# Patient Record
Sex: FEMALE | Employment: FULL TIME | URBAN - METROPOLITAN AREA
[De-identification: names, ages, dates, MRNs, and addresses within clinical notes are randomized per-mention and may not be internally consistent; named-entity substitution may affect disease eponyms.]

---

## 2020-01-09 ENCOUNTER — OFFICE VISIT (OUTPATIENT)
Dept: HEMATOLOGY | Age: 45
End: 2020-01-09

## 2020-01-09 VITALS
OXYGEN SATURATION: 96 % | BODY MASS INDEX: 36.95 KG/M2 | HEART RATE: 81 BPM | DIASTOLIC BLOOD PRESSURE: 87 MMHG | WEIGHT: 200.8 LBS | TEMPERATURE: 98.2 F | SYSTOLIC BLOOD PRESSURE: 126 MMHG | HEIGHT: 62 IN

## 2020-01-09 DIAGNOSIS — R74.8 ELEVATED LIVER ENZYMES: Primary | ICD-10-CM

## 2020-01-09 PROBLEM — F32.A DEPRESSION: Status: ACTIVE | Noted: 2020-01-09

## 2020-01-09 PROBLEM — Z98.84 H/O GASTRIC BYPASS: Status: ACTIVE | Noted: 2020-01-09

## 2020-01-09 PROBLEM — I10 HYPERTENSION: Status: ACTIVE | Noted: 2020-01-09

## 2020-01-09 PROBLEM — Z90.49 HISTORY OF CHOLECYSTECTOMY: Status: ACTIVE | Noted: 2020-01-09

## 2020-01-09 PROBLEM — E89.2 H/O PARATHYROIDECTOMY (HCC): Status: ACTIVE | Noted: 2020-01-09

## 2020-01-09 PROBLEM — Z90.710 S/P TAH (TOTAL ABDOMINAL HYSTERECTOMY): Status: ACTIVE | Noted: 2020-01-09

## 2020-01-09 PROBLEM — F41.9 ANXIETY: Status: ACTIVE | Noted: 2020-01-09

## 2020-01-09 RX ORDER — DULOXETIN HYDROCHLORIDE 60 MG/1
60 CAPSULE, DELAYED RELEASE ORAL DAILY
COMMUNITY
End: 2020-08-06

## 2020-01-09 RX ORDER — LISINOPRIL 40 MG/1
40 TABLET ORAL DAILY
COMMUNITY

## 2020-01-09 RX ORDER — PANTOPRAZOLE SODIUM 20 MG/1
40 TABLET, DELAYED RELEASE ORAL DAILY
COMMUNITY

## 2020-01-09 RX ORDER — ATENOLOL 50 MG/1
TABLET ORAL DAILY
COMMUNITY

## 2020-01-09 RX ORDER — AMLODIPINE BESYLATE 10 MG/1
TABLET ORAL DAILY
COMMUNITY

## 2020-01-09 NOTE — PROGRESS NOTES
[address] [facility], Margie PALACIOS Alberteen Athens, MD Vidal Retort, PA-C    Farhat Ocasio, ACNP-BC     Melania Gambino, [facility]   Douglas Delgado FNP-C    Piotr Fuentes, [facility]       Anne Deputado Ross [address]    at [address], [address] [place] [address], [place]  22.    [phone]    FAX: [address] [place] [address]    at [address], [address], [address]    [phone]    [address] [place] [address] Street: [phone]       Patient Care Team:  Fallon Pena MD as PCP - General (Family Practice)      Problem List  Date Reviewed: 1/9/2020          Codes Class Noted    Elevated liver enzymes ICD-10-CM: R74.8  ICD-9-CM: 790.5  1/9/2020        Hypertension ICD-10-CM: I10  ICD-9-CM: 401.9  1/9/2020        Anxiety ICD-10-CM: F41.9  ICD-9-CM: 300.00  1/9/2020        Depression ICD-10-CM: F32.9  ICD-9-CM: 086  1/9/2020        H/O gastric bypass ICD-10-CM: Z98.84  ICD-9-CM: V45.86  1/9/2020        History of cholecystectomy ICD-10-CM: Z90.49  ICD-9-CM: V45.79  1/9/2020        H/O parathyroidectomy ICD-10-CM: Z90.09  ICD-9-CM: V15.29  1/9/2020        S/P NISHI (total abdominal hysterectomy) ICD-10-CM: Z90.710  ICD-9-CM: V88.01  1/9/2020              The clinicians listed above have asked me to see Samson Tidwell in consultation regarding elevated liver enzymes and its management. All medical records sent by the referring physicians were reviewed     The patient is a 53 y.o.  female who was found to have elevated  liver transaminases in 1/2019. Serologic evaluation for markers of chronic liver disease has either not been performed or the results are not available. Imaging of the liver was not performed.       An assessment of liver fibrosis with biopsy or elastography has not been performed. The patient has no symptoms which can be attributed to the liver disorder. The patient is not currently experiencing the following symptoms of liver disease:  fatigue, pain in the right side over the liver,     The patient completes all daily activities without any functional limitations. ASSESSMENT AND PLAN:  Elevated liver enzymes  Persistent elevation in liver transaminases and alkaline phosphatase of unclear etiology at this time. Liver function is normal.  The platelet count is normal.      Based upon laboratory studies and imaging the patient does not appear to have significant liver injury. Serologic testing for causes of chronic liver disease were ordered. All were negative     The most likely causes for the liver chemistry abnormalities were discussed with the patient and include fatty liver disease,     Have performed laboratory testing to monitor liver function and degree of liver injury. This included BMP, hepatic panel, CBC with platelet count, INR. Will perform imaging of the liver with ultrasound. The need to perform an assessment of liver fibrosis was discussed with the patient. The Fibroscan can assess liver fibrosis and determine if a patient has advanced fibrosis or cirrhosis without the need for liver biopsy. This will be performed at the next office visit. If the Fibroscan suggests advanced fibrosis then a liver biopsy should be considered. The Fibroscan can be repeated annually or as often as clinically indicated to assess for fibrosis progression and/or regression. Elevation in Ferritin  There is an elevation in ferritin with Normal iron saturation. It is unlikely that the patient has hemochromatosis or is a carrier for an HFE gene. The elevation in ferritin reflects hepatic inflammation form fatty liver.     Screening for Hepatocellular Carcinoma  HCC screening is not necessary if the patient has no evidence of cirrhosis. Treatment of other medical problems in patients with chronic liver disease  There are no contraindications for the patient to take most medications that are necessary for treatment of other medical issues. Counseling for alcohol in patients with chronic liver disease  The patient was counseled regarding alcohol consumption and the effect of alcohol on chronic liver disease. It was recommended that all alcohol consumption be stopped and the patient be abstinent from alcohol    Vaccinations   Vaccination for viral hepatitis A and B is recommended since the patient has no serologic evidence of previous exposure or vaccination with immunity. Routine vaccinations against other bacterial and viral agents can be performed as indicated. Annual flu vaccination should be administered if indicated. ALLERGIES  Allergies   Allergen Reactions    Penicillins Rash    Sulfa (Sulfonamide Antibiotics) Other (comments)     \"Feeling of hot needle pricks\"    Erythromycin Other (comments)     vomiting        MEDICATIONS  Current Outpatient Medications   Medication Sig    atenolol (TENORMIN) 50 mg tablet Take  by mouth daily.  amLODIPine (NORVASC) 10 mg tablet Take  by mouth daily.  pantoprazole (PROTONIX) 20 mg tablet Take 40 mg by mouth daily.  lisinopril (PRINIVIL, ZESTRIL) 40 mg tablet Take 40 mg by mouth daily.  DULoxetine (CYMBALTA) 60 mg capsule Take 60 mg by mouth daily.  KRILL OIL PO Take  by mouth.  multivit/folic acid/vit K1 (ONE-A-DAY WOMEN'S 50 PLUS PO) Take  by mouth.  vitamin E acetate (VITAMIN E PO) Take  by mouth.  multivitamin (DAILY VITAMIN PO) Take  by mouth. Vit d , 1 a day    DANDELION PO Take  by mouth. No current facility-administered medications for this visit. SYSTEM REVIEW NOT RELATED TO LIVER DISEASE OR REVIEWED ABOVE:  Constitution systems: Negative for fever, chills, weight gain, weight loss. Eyes: Negative for visual changes.   ENT: Negative for sore throat, painful swallowing. Respiratory: Negative for cough, hemoptysis, SOB. Cardiology: Negative for chest pain, palpitations. GI:  Negative for constipation or diarrhea. : Negative for urinary frequency, dysuria, hematuria, nocturia. Skin: Negative for rash. Hematology: Negative for easy bruising, blood clots. Musculo-skelatal: Negative for back pain, muscle pain, weakness. Neurologic: Negative for headaches, dizziness, vertigo, memory problems not related to HE. Psychology: Negative for anxiety, depression. FAMILY HISTORY:  The father  Has/had the following following chronic disease(s): HTN, PVD. The mother Has/had the following chronic disease(s): CVA, MA, SZ. There is no family history of liver disease. SOCIAL HISTORY:  The patient is . The patient has 2 children, 2 stepchildren,   The patient currently smokes 3-4 cigarettes daily. 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 PHYSICAL EXAMINATION:  Visit Vitals  /87 (BP 1 Location: Left arm, BP Patient Position: Sitting)   Pulse 81   Temp 98.2 °F (36.8 °C) (Tympanic)   Ht 5' 2\" (1.575 m)   Wt 200 lb 12.8 oz (91.1 kg)   SpO2 96%   BMI 36.73 kg/m²     General: No acute distress. Eyes: Sclera anicteric. ENT: No oral lesions. Thyroid normal.  Nodes: No adenopathy. Skin: No spider angiomata. No jaundice. No palmar erythema. Respiratory: Lungs clear to auscultation. Cardiovascular: Regular heart rate. No murmurs. No JVD. Abdomen: Soft non-tender. Liver size normal to percussion/palpation. Spleen not palpable. No obvious ascites. Extremities: No edema. No muscle wasting. No gross arthritic changes. Neurologic: Alert and oriented. Cranial nerves grossly intact. No asterixis.     LABORATORY STUDIES:  [facility] of [address] [place] [address] & Units 1/9/2020   WBC 3.4 - 10.8 x10E3/uL 12.8 (H)   ANC 1.4 - 7.0 x10E3/uL 9.1 (H)   HGB 11.1 - 15.9 g/dL 14.9    - 450 x10E3/uL 302   INR 0.8 - 1.2 1.0   AST 0 - 40 IU/L 51 (H)   ALT 0 - 32 IU/L 62 (H)   Alk Phos 39 - 117 IU/L 136 (H)   Bili, Total 0.0 - 1.2 mg/dL 0.4   Bili, Direct 0.00 - 0.40 mg/dL 0.15   Albumin 3.5 - 5.5 g/dL 4.6   BUN 6 - 24 mg/dL 13   Creat 0.57 - 1.00 mg/dL 0.69   Na 134 - 144 mmol/L 139   K 3.5 - 5.2 mmol/L 4.8   Cl 96 - 106 mmol/L 98   CO2 20 - 29 mmol/L 19 (L)   Glucose 65 - 99 mg/dL 104 (H)     SEROLOGIES:  Serologies Latest Ref Rng & Units 1/9/2020   Hep A Ab, Total Negative Negative   Hep B Surface Ag Negative Negative   Hep B Core Ab, Total Negative Negative   Hep B Surface AB QL  Non Reactive   Hep C Ab 0.0 - 0.9 s/co ratio <0.1   Ferritin 15 - 150 ng/mL 244 (H)   Iron % Saturation 15 - 55 % 35   MARY, IFA  Negative   ASMCA 0 - 19 Units 4   Ceruloplasmin 19.0 - 39.0 mg/dL 32.5   Alpha-1 antitrypsin level 101 - 187 mg/dL 141     LIVER HISTOLOGY:  Not available or performed    ENDOSCOPIC PROCEDURES:  Not available or performed    RADIOLOGY:  1/2019. Ultrasound of liver. Echogenic consistent with chronic liver or fatty liver disease. No liver mass lesions. No dilated bile ducts. No ascites. OTHER TESTING:  Not available or performed    FOLLOW-UP:  All of the issues listed above in the Assessment and Plan were discussed with the patient. All questions were answered. The patient expressed a clear understanding of the above. [address] [place] [address] in 4 weeks for Fibroscan to review all data and determine the treatment plan.       MD Candie Dallas 13 of [address], [address] [place] [address] 22.  [phone]  [address] [facility]

## 2020-01-09 NOTE — Clinical Note
1/18/20 Patient: Francis Maravilla YOB: 1975 Date of Visit: 1/9/2020 Scott Penaloza MD 
[address] [facility] [address] [facility] [address] VIA Facsimile: [phone] Dear Scott Penaloza MD, Thank you for referring Ms. Francis Maravilla to [address] [facility] for evaluation. My notes for this consultation are attached. If you have questions, please do not hesitate to call me. I look forward to following your patient along with you. Sincerely, Praful Leach MD

## 2020-01-10 LAB
A1AT SERPL-MCNC: 141 MG/DL (ref 101–187)
ACTIN IGG SERPL-ACNC: 4 UNITS (ref 0–19)
ALBUMIN SERPL-MCNC: 4.6 G/DL (ref 3.5–5.5)
ALP SERPL-CCNC: 136 IU/L (ref 39–117)
ALT SERPL-CCNC: 62 IU/L (ref 0–32)
ANA TITR SER IF: NEGATIVE {TITER}
AST SERPL-CCNC: 51 IU/L (ref 0–40)
BASOPHILS # BLD AUTO: 0.1 X10E3/UL (ref 0–0.2)
BASOPHILS NFR BLD AUTO: 1 %
BILIRUB DIRECT SERPL-MCNC: 0.15 MG/DL (ref 0–0.4)
BILIRUB SERPL-MCNC: 0.4 MG/DL (ref 0–1.2)
BUN SERPL-MCNC: 13 MG/DL (ref 6–24)
BUN/CREAT SERPL: 19 (ref 9–23)
CALCIUM SERPL-MCNC: 9.8 MG/DL (ref 8.7–10.2)
CERULOPLASMIN SERPL-MCNC: 32.5 MG/DL (ref 19–39)
CHLORIDE SERPL-SCNC: 98 MMOL/L (ref 96–106)
CO2 SERPL-SCNC: 19 MMOL/L (ref 20–29)
COMMENT, 144067: NORMAL
CREAT SERPL-MCNC: 0.69 MG/DL (ref 0.57–1)
EOSINOPHIL # BLD AUTO: 0.2 X10E3/UL (ref 0–0.4)
EOSINOPHIL NFR BLD AUTO: 1 %
ERYTHROCYTE [DISTWIDTH] IN BLOOD BY AUTOMATED COUNT: 13.2 % (ref 11.7–15.4)
FERRITIN SERPL-MCNC: 244 NG/ML (ref 15–150)
GLUCOSE SERPL-MCNC: 104 MG/DL (ref 65–99)
HAV AB SER QL IA: NEGATIVE
HBV CORE AB SERPL QL IA: NEGATIVE
HBV SURFACE AB SER QL: NON REACTIVE
HBV SURFACE AG SERPL QL IA: NEGATIVE
HCT VFR BLD AUTO: 45.1 % (ref 34–46.6)
HCV AB S/CO SERPL IA: <0.1 S/CO RATIO (ref 0–0.9)
HGB BLD-MCNC: 14.9 G/DL (ref 11.1–15.9)
IMM GRANULOCYTES # BLD AUTO: 0.1 X10E3/UL (ref 0–0.1)
IMM GRANULOCYTES NFR BLD AUTO: 1 %
INR PPP: 1 (ref 0.8–1.2)
IRON SATN MFR SERPL: 35 % (ref 15–55)
IRON SERPL-MCNC: 125 UG/DL (ref 27–159)
LYMPHOCYTES # BLD AUTO: 2.5 X10E3/UL (ref 0.7–3.1)
LYMPHOCYTES NFR BLD AUTO: 20 %
MCH RBC QN AUTO: 33.3 PG (ref 26.6–33)
MCHC RBC AUTO-ENTMCNC: 33 G/DL (ref 31.5–35.7)
MCV RBC AUTO: 101 FL (ref 79–97)
MONOCYTES # BLD AUTO: 0.9 X10E3/UL (ref 0.1–0.9)
MONOCYTES NFR BLD AUTO: 7 %
NEUTROPHILS # BLD AUTO: 9.1 X10E3/UL (ref 1.4–7)
NEUTROPHILS NFR BLD AUTO: 70 %
PLATELET # BLD AUTO: 302 X10E3/UL (ref 150–450)
POTASSIUM SERPL-SCNC: 4.8 MMOL/L (ref 3.5–5.2)
PROT SERPL-MCNC: 7.3 G/DL (ref 6–8.5)
PROTHROMBIN TIME: 10.6 SEC (ref 9.1–12)
RBC # BLD AUTO: 4.48 X10E6/UL (ref 3.77–5.28)
SODIUM SERPL-SCNC: 139 MMOL/L (ref 134–144)
TIBC SERPL-MCNC: 355 UG/DL (ref 250–450)
UIBC SERPL-MCNC: 230 UG/DL (ref 131–425)
WBC # BLD AUTO: 12.8 X10E3/UL (ref 3.4–10.8)

## 2020-01-16 ENCOUNTER — HOSPITAL ENCOUNTER (OUTPATIENT)
Dept: ULTRASOUND IMAGING | Age: 45
Discharge: HOME | End: 2020-01-16
Attending: INTERNAL MEDICINE
Payer: COMMERCIAL

## 2020-01-16 DIAGNOSIS — R74.8 ELEVATED LIVER ENZYMES: ICD-10-CM

## 2020-01-16 PROCEDURE — 76705 ECHO EXAM OF ABDOMEN: CPT

## 2020-02-06 ENCOUNTER — OFFICE VISIT (OUTPATIENT)
Dept: HEMATOLOGY | Age: 45
End: 2020-02-06

## 2020-02-06 VITALS
BODY MASS INDEX: 36.66 KG/M2 | WEIGHT: 199.2 LBS | TEMPERATURE: 97.5 F | SYSTOLIC BLOOD PRESSURE: 116 MMHG | OXYGEN SATURATION: 91 % | HEIGHT: 62 IN | HEART RATE: 74 BPM | DIASTOLIC BLOOD PRESSURE: 79 MMHG

## 2020-02-06 DIAGNOSIS — E66.01 SEVERE OBESITY (HCC): ICD-10-CM

## 2020-02-06 DIAGNOSIS — R74.8 ELEVATED LIVER ENZYMES: Primary | ICD-10-CM

## 2020-02-06 NOTE — PROGRESS NOTES
[facility], Aayush PALACIOS, Rebekah Bae MD Garlan Mattock, ABBI Stewart, ACN-BC     April S Immanuel, [facility]   Kenneth Thompson, FNP-C    Ludmila Adrian, [facility]       Anne Rey [facility] [address]    at [address], [address] [place] [address], [facility] 22.    [phone]    FAX: [address] [place] [address] Avenue    [address], [address]    [phone]    [address] [place] [address] Street: [phone]       Patient Care Team:  María Carlton MD as PCP - General (Family Practice)      Problem List  Date Reviewed: 1/18/2020          Codes Class Noted    Severe obesity ([address] ICD-10-CM: E66.01  ICD-9-CM: 278.01  2/6/2020        Elevated liver enzymes ICD-10-CM: R74.8  ICD-9-CM: 790.5  1/9/2020        Hypertension ICD-10-CM: I10  ICD-9-CM: 401.9  1/9/2020        Anxiety ICD-10-CM: F41.9  ICD-9-CM: 300.00  1/9/2020        Depression ICD-10-CM: F32.9  ICD-9-CM: 476  1/9/2020        H/O gastric bypass ICD-10-CM: Z98.84  ICD-9-CM: V45.86  1/9/2020        History of cholecystectomy ICD-10-CM: Z90.49  ICD-9-CM: V45.79  1/9/2020        H/O parathyroidectomy ICD-10-CM: Z90.09  ICD-9-CM: V15.29  1/9/2020        S/P NISHI (total abdominal hysterectomy) ICD-10-CM: Z90.710  ICD-9-CM: V88.01  1/9/2020            Jyotsna Chiu returns to the [address] for management of elevated liver enzymes. The active problem list, all pertinent past medical history, medications, radiologic findings and laboratory findings related to the liver disorder were reviewed with the patient. The patient is a 53 y.o.  female who was found to have elevated  liver transaminases in 1/2019. Serologic evaluation for markers of chronic liver disease were negative.      Imaging of the liver was performed with ultrasound in 1/2020. This demonstrated increased echogenic changes with no mass. An assessment of liver fibrosis with biopsy or elastography has not been performed. Elastography is planned for today's visit. The patient has no symptoms which can be attributed to the liver disorder. The patient is not currently experiencing the following symptoms of liver disease:  fatigue, pain in the right side over the liver,     The patient completes all daily activities without any functional limitations. She has had foot surgery/fracture and has been limited in mobility for the past 6 months. She is now out of a walking cast.     ASSESSMENT AND PLAN:  Elevated liver enzymes  Serologies are negative for all common liver disease. I will draw AMA in the office today as this was not previously done. I have reviewed with the patient the results of her work-up so far and the imaging which has suggested steatosis. Fibroscan supports finding of steatosis without fibrosis. Based upon laboratory studies and imaging the patient does not appear to have significant liver injury    Persistent elevation in liver transaminases and alkaline phosphatase of unclear etiology at this time. .    The most likely causes for the liver chemistry abnormalities were discussed with the patient and include fatty liver disease. Elevation in Ferritin  There is an elevation in ferritin with normal iron saturation. It is unlikely that the patient has hemochromatosis or is a carrier for an HFE gene. The elevation in ferritin reflects hepatic inflammation form fatty liver and/'or regular alcohol use. Counseling for diet and weight loss in patients with confirmed or suspected NAFLD  The patient was counseled regarding diet and exercise to achieve weight loss. The best diet for patients with fatty liver is one very low in carbohydrates and enriched with protein.       There is no medication or vitamin supplements that we advocate for CHENG. Using glitazones in patients without diabetes mellitus has been shown to reduce fat content in the liver but has no effect on fibrosis and is associated with weight gain. Vitamin E has also been used but the data is not very good and most experts no longer advocate this. We have set a target goal for weight loss of ~165# as a long-term target. She is hopeful that she will be able to increase her activity now that her foot is better healed. We have also discussed cutting back on alcohol intake as a calorie reduction as well. Screening for Hepatocellular Carcinoma  HCC screening is not necessary as the patient has no evidence of cirrhosis. Treatment of other medical problems in patients with chronic liver disease  There are no contraindications for the patient to take most medications that are necessary for treatment of other medical issues. Counseling for alcohol in patients with chronic liver disease  The patient was counseled regarding alcohol consumption and the effect of alcohol on chronic liver disease. It was recommended that all alcohol consumption be stopped and the patient be abstinent from alcohol    Vaccinations   Vaccination for viral hepatitis A and B is recommended since the patient has no serologic evidence of previous exposure or vaccination with immunity. Routine vaccinations against other bacterial and viral agents can be performed as indicated. Annual flu vaccination should be administered if indicated. ALLERGIES  Allergies   Allergen Reactions    Penicillins Rash    Sulfa (Sulfonamide Antibiotics) Other (comments)     \"Feeling of hot needle pricks\"    Erythromycin Other (comments)     vomiting        MEDICATIONS  Current Outpatient Medications   Medication Sig    atenolol (TENORMIN) 50 mg tablet Take  by mouth daily.  amLODIPine (NORVASC) 10 mg tablet Take  by mouth daily.     pantoprazole (PROTONIX) 20 mg tablet Take 40 mg by mouth daily.  lisinopril (PRINIVIL, ZESTRIL) 40 mg tablet Take 40 mg by mouth daily.  DULoxetine (CYMBALTA) 60 mg capsule Take 60 mg by mouth daily.  KRILL OIL PO Take  by mouth.  multivit/folic acid/vit K1 (ONE-A-DAY WOMEN'S 50 PLUS PO) Take  by mouth.  vitamin E acetate (VITAMIN E PO) Take  by mouth.  multivitamin (DAILY VITAMIN PO) Take  by mouth. Vit d , 1 a day    DANDELION PO Take  by mouth. No current facility-administered medications for this visit. SYSTEM REVIEW NOT RELATED TO LIVER DISEASE OR REVIEWED ABOVE:  Constitution systems: Negative for fever, chills. Weight gain of 20# in the past 6 months. Eyes: Negative for visual changes. ENT: Negative for sore throat, painful swallowing. Respiratory: Negative for cough, hemoptysis, SOB. Cardiology: Negative for chest pain, palpitations. GI:  Negative for constipation or diarrhea. : Negative for urinary frequency, dysuria, hematuria, nocturia. Skin: Negative for rash. Hematology: Negative for easy bruising, blood clots. Musculo-skeletal: Negative for back pain, muscle pain, weakness. Neurologic: Negative for headaches, dizziness, vertigo, memory problems not related to HE. Psychology: Negative for anxiety, depression. FAMILY HISTORY:  The father  Has/had the following following chronic disease(s): HTN, PVD. The mother Has/had the following chronic disease(s): CVA, MA, SZ. There is no family history of liver disease. SOCIAL HISTORY:  The patient is . The patient has 2 children, 2 stepchildren. The patient currently smokes 3-4 cigarettes daily. The patient consumes 1-5 alcoholic beverages per day. 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       PHYSICAL EXAMINATION:  Visit Vitals  /79 (BP 1 Location: Left arm, BP Patient Position: Sitting)   Pulse 74   Temp 97.5 °F (36.4 °C) (Tympanic)   Ht 5' 2\" (1.575 m)   Wt 199 lb 3.2 oz (90.4 kg)   SpO2 91%   BMI 36.43 kg/m² General: No acute distress. Eyes: Sclera anicteric. ENT: No oral lesions. Thyroid normal.  Nodes: No adenopathy. Skin: No spider angiomata. No jaundice. No palmar erythema. Respiratory: Lungs clear to auscultation. Cardiovascular: Regular heart rate. No murmurs. No JVD. Abdomen: Soft non-tender. Liver size normal to percussion/palpation. Spleen not palpable. No obvious ascites. Extremities: No edema. No muscle wasting. No gross arthritic changes. Neurologic: Alert and oriented. Cranial nerves grossly intact. No asterixis. LABORATORY STUDIES:  Liver [place] [address] Units 1/9/2020   WBC 3.4 - 10.8 x10E3/uL 12.8 (H)   ANC 1.4 - 7.0 x10E3/uL 9.1 (H)   HGB 11.1 - 15.9 g/dL 14.9    - 450 x10E3/uL 302   INR 0.8 - 1.2 1.0   AST 0 - 40 IU/L 51 (H)   ALT 0 - 32 IU/L 62 (H)   Alk Phos 39 - 117 IU/L 136 (H)   Bili, Total 0.0 - 1.2 mg/dL 0.4   Bili, Direct 0.00 - 0.40 mg/dL 0.15   Albumin 3.5 - 5.5 g/dL 4.6   BUN 6 - 24 mg/dL 13   Creat 0.57 - 1.00 mg/dL 0.69   Na 134 - 144 mmol/L 139   K 3.5 - 5.2 mmol/L 4.8   Cl 96 - 106 mmol/L 98   CO2 20 - 29 mmol/L 19 (L)   Glucose 65 - 99 mg/dL 104 (H)   Additional lab values drawn at today's office visit are pending at the time of documentation. SEROLOGIES:  Serologies Latest Ref Rng & Units 1/9/2020   Hep A Ab, Total Negative Negative   Hep B Surface Ag Negative Negative   Hep B Core Ab, Total Negative Negative   Hep B Surface AB QL  Non Reactive   Hep C Ab 0.0 - 0.9 s/co ratio <0.1   Ferritin 15 - 150 ng/mL 244 (H)   Iron % Saturation 15 - 55 % 35   MARY, IFA  Negative   ASMCA 0 - 19 Units 4   Ceruloplasmin 19.0 - 39.0 mg/dL 32.5   Alpha-1 antitrypsin level 101 - 187 mg/dL 141     LIVER HISTOLOGY:  2/2020. FibroScan performed at [facility]. EkPa was 4.5. Suggested fibrosis level is F0-1. CAP score is 285, this is consistent with steatosis.      ENDOSCOPIC PROCEDURES:  Not available or performed    RADIOLOGY:  1/2019. Ultrasound of liver. Echogenic consistent with chronic liver or fatty liver disease. No liver mass lesions. No dilated bile ducts. No ascites. OTHER TESTING:  Not available or performed    FOLLOW-UP:  All of the issues listed above in the Assessment and Plan were discussed with the patient. All questions were answered. The patient expressed a clear understanding of the above. [address] [id number] in 6 months for monitoring response to weight loss.      Gianna Fulton PA-C  Liver [place] 59, [address] 22.  [phone]  [address] [place] [address]

## 2020-02-06 NOTE — PROGRESS NOTES
Pari Billingsley is a 53 y.o. female  Chief Complaint   Patient presents with    Follow-up     Fibro Scan         Visit Vitals  /79 (BP 1 Location: Left arm, BP Patient Position: Sitting)   Pulse 74   Temp 97.5 °F (36.4 °C) (Tympanic)   Ht 5' 2\" (1.575 m)   Wt 199 lb 3.2 oz (90.4 kg)   SpO2 91%   BMI 36.43 kg/m²         3 most recent PHQ Screens 1/9/2020   Little interest or pleasure in doing things Not at all   Feeling down, depressed, irritable, or hopeless Not at all   Total Score PHQ 2 0     Learning Assessment 1/9/2020   PRIMARY LEARNER Patient   HIGHEST LEVEL OF EDUCATION - PRIMARY LEARNER  2 YEARS [place] PRIMARY LEARNER NONE   CO-LEARNER CAREGIVER No   PRIMARY LANGUAGE ENGLISH   LEARNER PREFERENCE PRIMARY DEMONSTRATION   ANSWERED BY patient   RELATIONSHIP SELF     Abuse Screening Questionnaire 1/9/2020   Do you ever feel afraid of your partner? N   Are you in a relationship with someone who physically or mentally threatens you? N   Is it safe for you to go home? Y         1. Have you been to the ER, urgent care clinic since your last visit? Hospitalized since your last visit? No    2. Have you seen or consulted any other health care providers outside of the [address] [place] [address] since your last visit? Include any pap smears or colon screening.  No

## 2020-02-07 LAB
ACE SERPL-CCNC: 7 U/L (ref 14–82)
ALBUMIN SERPL-MCNC: 4.4 G/DL (ref 3.8–4.8)
ALP SERPL-CCNC: 282 IU/L (ref 39–117)
ALT SERPL-CCNC: 437 IU/L (ref 0–32)
AST SERPL-CCNC: 501 IU/L (ref 0–40)
BILIRUB DIRECT SERPL-MCNC: 0.27 MG/DL (ref 0–0.4)
BILIRUB SERPL-MCNC: 0.7 MG/DL (ref 0–1.2)
ERYTHROCYTE [DISTWIDTH] IN BLOOD BY AUTOMATED COUNT: 13.4 % (ref 11.7–15.4)
HCT VFR BLD AUTO: 44.5 % (ref 34–46.6)
HGB BLD-MCNC: 15 G/DL (ref 11.1–15.9)
MCH RBC QN AUTO: 34.2 PG (ref 26.6–33)
MCHC RBC AUTO-ENTMCNC: 33.7 G/DL (ref 31.5–35.7)
MCV RBC AUTO: 102 FL (ref 79–97)
PLATELET # BLD AUTO: 302 X10E3/UL (ref 150–450)
RBC # BLD AUTO: 4.38 X10E6/UL (ref 3.77–5.28)
WBC # BLD AUTO: 9.8 X10E3/UL (ref 3.4–10.8)

## 2020-02-09 LAB — MITOCHONDRIA M2 IGG SER-ACNC: <20 UNITS (ref 0–20)

## 2020-02-14 DIAGNOSIS — R74.8 ELEVATED LIVER ENZYMES: Primary | ICD-10-CM

## 2020-02-14 NOTE — PROGRESS NOTES
Marked rise in liver enzymes. Reviewed with patient, concern that this elevation in enzymes may be due to alcohol influence as there is no other etiology identified, she has started no new medications and is without symptoms. Asked that she moderate/eliminate her alcohol intake and repeat labs in 3-4 weeks. Lab requisition mailed to patient.

## 2020-02-17 ENCOUNTER — DOCUMENTATION ONLY (OUTPATIENT)
Dept: HEMATOLOGY | Age: 45
End: 2020-02-17

## 2020-02-24 ENCOUNTER — DOCUMENTATION ONLY (OUTPATIENT)
Dept: HEMATOLOGY | Age: 45
End: 2020-02-24

## 2020-02-24 NOTE — PROGRESS NOTES
Spoke with patient, she had restarted Duloxetine possibly prior to the elevation of the liver enzymes. This is an unusual (<1% of pts have 3x ULN) event, but I have asked her to repeat abs as we had discussed and remain on Cymbalta for the time being. She has cut out alcohol nearly entirely. Will recheck labs in 2 wks.

## 2020-03-09 DIAGNOSIS — R74.8 ELEVATED LIVER ENZYMES: ICD-10-CM

## 2020-03-13 ENCOUNTER — TELEPHONE (OUTPATIENT)
Dept: HEMATOLOGY | Age: 45
End: 2020-03-13

## 2020-03-13 LAB
ALBUMIN SERPL-MCNC: 4.3 G/DL (ref 3.8–4.8)
ALP SERPL-CCNC: 101 IU/L (ref 39–117)
ALT SERPL-CCNC: 26 IU/L (ref 0–32)
AST SERPL-CCNC: 24 IU/L (ref 0–40)
BILIRUB DIRECT SERPL-MCNC: 0.08 MG/DL (ref 0–0.4)
BILIRUB SERPL-MCNC: 0.2 MG/DL (ref 0–1.2)

## 2020-03-13 NOTE — PROGRESS NOTES
Pt notified of normalized liver enzymes. She has continued with moderation of alcohol intake, dietary changes to support weight loss and elimination of Cymbalta. She is doing well without the SSRI at this time. Advised follow-up in 8/2020 as scheduled. Will forward a copy of labs to PCP.

## 2020-03-13 NOTE — TELEPHONE ENCOUNTER
Recent labs Faxed to PCP Katarina Fernandez w/ confirmation !    (called pcp to confirm fax and fax received)

## 2020-03-13 NOTE — TELEPHONE ENCOUNTER
----- Message from Hazle Seip, PA sent at 3/13/2020  9:38 AM EDT -----  Regarding: forward labs  Please fax a copy of this patient's most recent 3/2020 labs to her PCP, Dr Nita Julio. Thanks.

## 2020-08-06 ENCOUNTER — VIRTUAL VISIT (OUTPATIENT)
Dept: HEMATOLOGY | Age: 45
End: 2020-08-06
Payer: COMMERCIAL

## 2020-08-06 DIAGNOSIS — R74.8 ELEVATED LIVER ENZYMES: Primary | ICD-10-CM

## 2020-08-06 PROCEDURE — 99214 OFFICE O/P EST MOD 30 MIN: CPT | Performed by: PHYSICIAN ASSISTANT

## 2020-08-06 NOTE — PROGRESS NOTES
[address] [place], [place] [address]MD Marybeth Logan, Isidro Inks, MD Morene Mayer, PA-C Danae Headland, [facility]     Melania Gambino, [facility]   KATHYA Barry, [facility]       Anne Sharpe 136    at [address], [address] [place] [address], [facility] 22.    [phone]    FAX: [address] [place] [address]    at [address], [address]    [phone]    [address] [place] [address] Street: [phone]       Patient Care Team:  Dia Canela MD as PCP - General (Family Medicine)      Problem List  Date Reviewed: 2/6/2020          Codes Class Noted    Severe obesity ([address] ICD-10-CM: E66.01  ICD-9-CM: 278.01  2/6/2020        Elevated liver enzymes ICD-10-CM: R74.8  ICD-9-CM: 790.5  1/9/2020        Hypertension ICD-10-CM: I10  ICD-9-CM: 401.9  1/9/2020        Anxiety ICD-10-CM: F41.9  ICD-9-CM: 300.00  1/9/2020        Depression ICD-10-CM: F32.9  ICD-9-CM: 691  1/9/2020        H/O gastric bypass ICD-10-CM: Z98.84  ICD-9-CM: V45.86  1/9/2020        History of cholecystectomy ICD-10-CM: Z90.49  ICD-9-CM: V45.79  1/9/2020        H/O parathyroidectomy ICD-10-CM: Z90.09  ICD-9-CM: V15.29  1/9/2020        S/P NISHI (total abdominal hysterectomy) ICD-10-CM: Z90.710  ICD-9-CM: V88.01  1/9/2020            VIRTUAL TELEHEALTH VISIT PERFORMED DUE TO COVID-19 EPIDEMIC    CONSENT:  Svitlana Puga, who was seen by synchronous, real-time, audio-video technology, and/or her healthcare decision maker, is aware that this patient-initiated, Telehealth encounter on 8/6/2020 is a billable service, with coverage as determined by her insurance carrier. She is aware that she may receive a bill and has provided verbal consent to proceed. This patient was evaluated during a Virtual Telehealth visit.   A caregiver was present if appropriate. Due to this being a TeleHealth encounter performed during the UXJZO-19 public health emergency, the physical examination was limited to that listed in the [address] [facility] [place] for management of elevated liver enzymes. The active problem list, all pertinent past medical history, medications, radiologic findings and laboratory findings related to the liver disorder were reviewed with the patient. The patient is a 50 y.o.  female who was found to have elevated  liver transaminases in 1/2019. Serologic evaluation for markers of chronic liver disease were negative. Imaging of the liver was performed with ultrasound in 1/2020. This demonstrated increased echogenic changes with no mass. An assessment of liver fibrosis with elastography has been performed. This showed no increase in fibrosis, elevation in CAP score consistent with steatosis. The patient has no symptoms which can be attributed to the liver disorder. The patient is not currently experiencing the following symptoms of liver disease:  Fatigue. The patient completes all daily activities without any functional limitations. She has been walking with a cane due to ankle fracture history and ongoing arthritis. This has limited her activity and she has gained ~25# since her last office visit. ASSESSMENT AND PLAN:  Elevated liver enzymes  This is likely due to NAFL  Serologies are negative for all common liver disease. Will send her for updated labs at this time. At the time of her last office visit, her liver enzymes were markedly elevated. She was concerned that this may have been associated with the start of an SSRI and discontinued medication at that time. These lab values have since returned to normal.     Fibroscan supports finding of steatosis without fibrosis.  Based upon laboratory studies and imaging the patient does not appear to have significant liver injury    The most likely causes for the liver chemistry abnormalities were discussed with the patient and include fatty liver disease. Elevation in Ferritin  There is an elevation in ferritin with normal iron saturation. It is unlikely that the patient has hemochromatosis or is a carrier for an HFE gene. The elevation in ferritin reflects hepatic inflammation form fatty liver and/'or regular alcohol use. Counseling for diet and weight loss in patients with confirmed or suspected NAFLD  The patient was counseled regarding diet and exercise to achieve weight loss. The best diet for patients with fatty liver is one very low in carbohydrates and enriched with protein. There is no medication or vitamin supplements that we advocate for CHENG. Using glitazones in patients without diabetes mellitus has been shown to reduce fat content in the liver but has no effect on fibrosis and is associated with weight gain. Vitamin E has also been used but the data is not very good and most experts no longer advocate this. We have set a target goal for weight loss of ~180# as a long-term target. We have also discussed cutting back on alcohol intake as a calorie reduction as well. Screening for Hepatocellular Carcinoma  HCC screening is not necessary as the patient has no evidence of cirrhosis. Treatment of other medical problems in patients with chronic liver disease  There are no contraindications for the patient to take most medications that are necessary for treatment of other medical issues. Counseling for alcohol in patients with chronic liver disease  The patient was counseled regarding alcohol consumption and the effect of alcohol on chronic liver disease.   It was recommended that all alcohol consumption be stopped and the patient be abstinent from alcohol    Vaccinations   Vaccination for viral hepatitis A and B is recommended since the patient has no serologic evidence of previous exposure or vaccination with immunity. Routine vaccinations against other bacterial and viral agents can be performed as indicated. Annual flu vaccination should be administered if indicated. ALLERGIES  Allergies   Allergen Reactions    Penicillins Rash    Sulfa (Sulfonamide Antibiotics) Other (comments)     \"Feeling of hot needle pricks\"    Erythromycin Other (comments)     vomiting        MEDICATIONS  Current Outpatient Medications   Medication Sig    atenolol (TENORMIN) 50 mg tablet Take  by mouth daily.  amLODIPine (NORVASC) 10 mg tablet Take  by mouth daily.  pantoprazole (PROTONIX) 20 mg tablet Take 40 mg by mouth daily.  lisinopril (PRINIVIL, ZESTRIL) 40 mg tablet Take 40 mg by mouth daily.  DULoxetine (CYMBALTA) 60 mg capsule Take 60 mg by mouth daily.  KRILL OIL PO Take  by mouth.  multivit/folic acid/vit K1 (ONE-A-DAY WOMEN'S 50 PLUS PO) Take  by mouth.  vitamin E acetate (VITAMIN E PO) Take  by mouth.  multivitamin (DAILY VITAMIN PO) Take  by mouth. Vit d , 1 a day    DANDELION PO Take  by mouth. No current facility-administered medications for this visit. SYSTEM REVIEW NOT RELATED TO LIVER DISEASE OR REVIEWED ABOVE:  Constitution systems: Negative for fever, chills. Weight gain of 25# in the past 6 months. Eyes: Negative for visual changes. ENT: Negative for sore throat, painful swallowing. Respiratory: Negative for cough, hemoptysis, SOB. Cardiology: Negative for chest pain, palpitations. GI:  Negative for constipation or diarrhea. : Negative for urinary frequency, dysuria, hematuria, nocturia. Skin: Negative for rash. Hematology: Negative for easy bruising, blood clots. Musculo-skeletal: Negative for back pain, muscle pain, weakness. Neurologic: Negative for headaches, dizziness, vertigo, memory problems not related to HE. Psychology: Negative for anxiety, depression.      FAMILY HISTORY:  The father  Has/had the following following chronic disease(s): HTN, PVD. The mother Has/had the following chronic disease(s): CVA, MA, SZ. There is no family history of liver disease. SOCIAL HISTORY:  The patient is . The patient has 2 children, 2 stepchildren. The patient currently smokes 3-4 cigarettes daily. The patient consumes 1-5 alcoholic beverages per day. 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 PHYSICAL EXAMINATION PERFORMED BY VIRTUAL TELEHEALTH:  VS: Not performed   General: No acute distress. Eyes: Sclera anicteric. ENT: No oral lesions. Skin: No rashes. spider angiomata. No jaundice. Abdomen: No obvious distention suggesting ascites. Extremities: No edema. No muscle wasting. Neurologic: Alert and oriented. Cranial nerves grossly intact.     LABORATORY STUDIES:  Liver [place] [address] Units 3/12/2020 2/6/2020 1/9/2020   WBC 3.4 - 10.8 x10E3/uL  9.8 12.8 (H)   ANC 1.4 - 7.0 x10E3/uL   9.1 (H)   HGB 11.1 - 15.9 g/dL  15.0 14.9    - 450 x10E3/uL  302 302   INR 0.8 - 1.2   1.0   AST 0 - 40 IU/L 24 501 (HH) 51 (H)   ALT 0 - 32 IU/L 26 437 (H) 62 (H)   Alk Phos 39 - 117 IU/L 101 282 (H) 136 (H)   Bili, Total 0.0 - 1.2 mg/dL 0.2 0.7 0.4   Bili, Direct 0.00 - 0.40 mg/dL 0.08 0.27 0.15   Albumin 3.8 - 4.8 g/dL 4.3 4.4 4.6   BUN 6 - 24 mg/dL   13   Creat 0.57 - 1.00 mg/dL   0.69   Na 134 - 144 mmol/L   139   K 3.5 - 5.2 mmol/L   4.8   Cl 96 - 106 mmol/L   98   CO2 20 - 29 mmol/L   19 (L)   Glucose 65 - 99 mg/dL   104 (H)     SEROLOGIES:  Serologies Latest Ref Rng & Units 1/9/2020   Hep A Ab, Total Negative Negative   Hep B Surface Ag Negative Negative   Hep B Core Ab, Total Negative Negative   Hep B Surface AB QL  Non Reactive   Hep C Ab 0.0 - 0.9 s/co ratio <0.1   Ferritin 15 - 150 ng/mL 244 (H)   Iron % Saturation 15 - 55 % 35   MARY, IFA  Negative   ASMCA 0 - 19 Units 4   Ceruloplasmin 19.0 - 39.0 mg/dL 32.5   Alpha-1 antitrypsin level 101 - 187 mg/dL 141     LIVER HISTOLOGY:  2/2020. FibroScan performed at [address]. EkPa was 4.5. Suggested fibrosis level is F0-1. CAP score is 285, this is consistent with steatosis. ENDOSCOPIC PROCEDURES:  Not available or performed    RADIOLOGY:  1/2019. Ultrasound of liver. Echogenic consistent with chronic liver or fatty liver disease. No liver mass lesions. No dilated bile ducts. No ascites. OTHER TESTING:  Not available or performed    FOLLOW-UP AFTER VIRTUAL VISIT:  Pursuant to the emergency declaration under the [address] [place] [address], Critical access hospital waiver authority and the [employer] Act, this Virtual  Visit was conducted, with the patient's (and/or their legal guardian's) consent, to reduce the patient's risk of exposure to COVID-19 and provide necessary medical care. Services were provided through a video synchronous discussion virtually to substitute for an in-person clinic visit. The patient was located in their home. The provider was located in the [place] [address] office. All of the issues listed above in the Assessment and Plan were discussed with the patient. All questions were answered. The patient expressed a clear understanding of the above. Orders to obtain laboratory testing will be mailed to the patient and obtained locally. An in-person follow-up visit will be scheduled at [place] [address] in 6 months for Fibroscan and for routine monitoring.     Joey Strong PA-C  Liver [facility] 59, [address] [facility] [place] 22.  [phone]  [address]

## 2020-11-06 ENCOUNTER — TRANSCRIBE ORDER (OUTPATIENT)
Dept: SCHEDULING | Age: 45
End: 2020-11-06

## 2020-11-06 DIAGNOSIS — S82.891A ANKLE FRACTURE, RIGHT: ICD-10-CM

## 2020-11-06 DIAGNOSIS — M25.571 RIGHT ANKLE PAIN: Primary | ICD-10-CM

## 2020-11-06 DIAGNOSIS — M93.271 OSTEOCHONDRITIS DISSECANS OF ANKLE, RIGHT: ICD-10-CM

## 2022-03-18 PROBLEM — F32.A DEPRESSION: Status: ACTIVE | Noted: 2020-01-09

## 2022-03-19 PROBLEM — I10 HYPERTENSION: Status: ACTIVE | Noted: 2020-01-09

## 2022-03-19 PROBLEM — E89.2 H/O PARATHYROIDECTOMY (HCC): Status: ACTIVE | Noted: 2020-01-09

## 2022-03-19 PROBLEM — F41.9 ANXIETY: Status: ACTIVE | Noted: 2020-01-09

## 2022-03-19 PROBLEM — Z98.84 H/O GASTRIC BYPASS: Status: ACTIVE | Noted: 2020-01-09

## 2022-03-19 PROBLEM — E66.01 SEVERE OBESITY (HCC): Status: ACTIVE | Noted: 2020-02-06

## 2022-03-19 PROBLEM — R74.8 ELEVATED LIVER ENZYMES: Status: ACTIVE | Noted: 2020-01-09

## 2022-03-19 PROBLEM — Z90.49 HISTORY OF CHOLECYSTECTOMY: Status: ACTIVE | Noted: 2020-01-09

## 2022-03-20 PROBLEM — Z90.710 S/P TAH (TOTAL ABDOMINAL HYSTERECTOMY): Status: ACTIVE | Noted: 2020-01-09

## 2023-05-19 RX ORDER — AMLODIPINE BESYLATE 10 MG/1
TABLET ORAL DAILY
COMMUNITY

## 2023-05-19 RX ORDER — ATENOLOL 50 MG/1
TABLET ORAL DAILY
COMMUNITY

## 2023-05-19 RX ORDER — LISINOPRIL 40 MG/1
40 TABLET ORAL DAILY
COMMUNITY

## 2023-05-19 RX ORDER — PANTOPRAZOLE SODIUM 20 MG/1
40 TABLET, DELAYED RELEASE ORAL DAILY
COMMUNITY